# Patient Record
Sex: MALE | Race: OTHER | HISPANIC OR LATINO | URBAN - METROPOLITAN AREA
[De-identification: names, ages, dates, MRNs, and addresses within clinical notes are randomized per-mention and may not be internally consistent; named-entity substitution may affect disease eponyms.]

---

## 2019-10-17 ENCOUNTER — EMERGENCY (EMERGENCY)
Facility: HOSPITAL | Age: 24
LOS: 1 days | Discharge: HOME | End: 2019-10-17
Attending: STUDENT IN AN ORGANIZED HEALTH CARE EDUCATION/TRAINING PROGRAM
Payer: SELF-PAY

## 2019-10-17 VITALS
DIASTOLIC BLOOD PRESSURE: 80 MMHG | SYSTOLIC BLOOD PRESSURE: 129 MMHG | TEMPERATURE: 98 F | OXYGEN SATURATION: 98 % | WEIGHT: 145.95 LBS | HEIGHT: 65 IN | HEART RATE: 74 BPM | RESPIRATION RATE: 18 BRPM

## 2019-10-17 PROCEDURE — 99053 MED SERV 10PM-8AM 24 HR FAC: CPT

## 2019-10-17 PROCEDURE — 99283 EMERGENCY DEPT VISIT LOW MDM: CPT

## 2019-10-17 NOTE — ED ADULT TRIAGE NOTE - CHIEF COMPLAINT QUOTE
c/o throat pain, reports being seen and f/u in Urgent Care for same symptoms, pt was placed on abx with no response to po abx, pt present with lab results for +throat cultures for pseudomonas, pt was sent to f/u with ENT did not as visit too expensive

## 2019-10-18 PROCEDURE — 99283 EMERGENCY DEPT VISIT LOW MDM: CPT | Mod: 25

## 2019-10-18 PROCEDURE — 96372 THER/PROPH/DIAG INJ SC/IM: CPT

## 2019-10-18 PROCEDURE — T1013: CPT

## 2019-10-18 RX ORDER — IBUPROFEN 200 MG
600 TABLET ORAL ONCE
Refills: 0 | Status: COMPLETED | OUTPATIENT
Start: 2019-10-18 | End: 2019-10-18

## 2019-10-18 RX ORDER — BENZOCAINE AND MENTHOL 5; 1 G/100ML; G/100ML
2 LIQUID ORAL ONCE
Refills: 0 | Status: COMPLETED | OUTPATIENT
Start: 2019-10-18 | End: 2019-10-18

## 2019-10-18 RX ORDER — DEXAMETHASONE 0.5 MG/5ML
6 ELIXIR ORAL ONCE
Refills: 0 | Status: COMPLETED | OUTPATIENT
Start: 2019-10-18 | End: 2019-10-18

## 2019-10-18 RX ADMIN — BENZOCAINE AND MENTHOL 2 LOZENGE: 5; 1 LIQUID ORAL at 01:35

## 2019-10-18 RX ADMIN — Medication 600 MILLIGRAM(S): at 01:34

## 2019-10-18 RX ADMIN — Medication 6 MILLIGRAM(S): at 01:34

## 2019-10-18 NOTE — ED PROVIDER NOTE - CLINICAL SUMMARY MEDICAL DECISION MAKING FREE TEXT BOX
Pt in ED for throat pain x 3 weeks. Pt completed course of levaquin x 2 weeks ago to cover pseudomonas+ throat culture. VSS, in NAD, non-toxic appearing, tolerating PO intake. Centor criteria 1 (absence of cough) at this time, no indication for further abx. Pt instructed to use tylenol/motrin for pain and educated on supportive care for symptoms. F/u ENT, referral provided.

## 2019-10-18 NOTE — ED PROVIDER NOTE - ATTENDING CONTRIBUTION TO CARE
I personally saw the patient with the PA, and completed the key components of the history and physical exam. I then discussed the management plan with the PA.    pharyngitis

## 2019-10-18 NOTE — ED PROVIDER NOTE - PATIENT PORTAL LINK FT
You can access the FollowMyHealth Patient Portal offered by [facility] by registering at the following website: [email or web address]. By joining [facility]’s FollowMyHealth portal, you will also be able to view your health information using other applications (apps) compatible with our system.

## 2019-10-18 NOTE — ED PROVIDER NOTE - NS ED ROS FT
Gen: denies weakness, malaise/fatigue, fever, chills  Skin: denies rashes, hives  HEENT: +Throat pain. Denies visual changes, congestion, ear pain  Respiratory: denies cough, dyspnea, ALFARO, SOB, wheezing  Cardiovascular: denies chest pain, palpitations, diaphoresis, edema  GI: denies abdominal pain, nausea/vomiting, diarrhea/constipation  MSK: denies limitation on movement, weakness, joint swelling/redness/warmth  Neuro: denies LOC, syncope, headache, dizziness, vertigo, numbness/tingling, paresthesia

## 2019-10-18 NOTE — ED PROVIDER NOTE - OBJECTIVE STATEMENT
25yo male no pmhx presents to ED for throat pain x 3 weeks. Pt was seen by [facility] clinic in September and had throat culture which grew pseudomonas, placed on levaquin. Pt states he completed course of antibiotics as directed x 2 weeks ago but is still noting throat pain and difficulty swallowing. Pt has not been taking medications for pain. Pt was referred to ENT doctor after completion of abx but states he did not follow-up because he does not have insurance and appointment was expensive. Pt went back to [facility] this evening and was sent to ED for further evaluation. Denies fever, chills, CP, SOB, headache, cough, nasal congestion.  PMD: [facility]  : Josefina Neville

## 2019-10-18 NOTE — ED PROVIDER NOTE - PHYSICAL EXAMINATION
Const: Awake, alert and oriented. In no acute distress. Well appearing.  HEENT: NC/AT. Moist mucous membranes. Throat with post-nasal drip, tonsils non edematous, no exudates. Uvula midline.  Eyes: No scleral icterus. EOMI.  Neck:. Soft and supple. Full ROM without pain.  Cardiac: Regular rate and regular rhythm. +S1/S2. Peripheral pulses 2+ and symmetric. No LE edema.  Resp: Speaking in full sentences. No evidence of respiratory distress. No wheezes, rales or rhonchi.  Abd: Soft, non-tender, non-distended. Normal bowel sounds in all 4 quadrants. No guarding or rebound.  Back: Spine midline and non-tender. No CVAT.  Skin: No rashes, abrasions or lacerations.  Lymph: No cervical lymphadenopathy.  Neuro: Awake, alert & oriented x 3. Moves all extremities symmetrically.